# Patient Record
Sex: FEMALE | Race: OTHER | URBAN - METROPOLITAN AREA
[De-identification: names, ages, dates, MRNs, and addresses within clinical notes are randomized per-mention and may not be internally consistent; named-entity substitution may affect disease eponyms.]

---

## 2024-07-22 ENCOUNTER — INITIAL PRENATAL (OUTPATIENT)
Age: 34
End: 2024-07-22

## 2024-07-22 VITALS
BODY MASS INDEX: 31.41 KG/M2 | WEIGHT: 160 LBS | HEIGHT: 60 IN | DIASTOLIC BLOOD PRESSURE: 78 MMHG | SYSTOLIC BLOOD PRESSURE: 126 MMHG

## 2024-07-22 DIAGNOSIS — Z34.91 INITIAL OBSTETRIC VISIT IN FIRST TRIMESTER: Primary | ICD-10-CM

## 2024-07-22 DIAGNOSIS — Z34.90 PREGNANCY, UNSPECIFIED GESTATIONAL AGE: ICD-10-CM

## 2024-07-22 PROCEDURE — 0500F INITIAL PRENATAL CARE VISIT: CPT | Performed by: OBSTETRICS & GYNECOLOGY

## 2024-07-22 NOTE — PROGRESS NOTES
Initial OB note:  Please refer to problem list for concerns        Substance history: negative for alcohol, tobacco and street drugs.           Positive for nothing.  Exposure history: There is/are no indoor cat/s in the home.  The patient was instructed to not change the cat litter.   She admits close contact with children on a regular basis.   She has had chicken pox or the vaccine in the past.   Patient denies issues with domestic violence.     Genetic Screening/Teratology Counseling: (Includes patient, baby's father, or anyone in either family with:)  1.  Patient's age >/= 35 at EDC?--yes  2.  Thalassemia (Greenlandic, Greek, Mediterranean, or  background): MCV<80?--no.     3.  Neural tube defect (meningomyelocele, spina bifida, anencephaly)?--no.   4.  Congenital heart defect?--no.  5.  Down syndrome?--no.   6.  Juan Ramon-Sachs (Episcopalian, Vietnamese [place])?--no.   7.  Canavan's Disease?--no.   8.  Familial Dysautonomia?--no.   9.  Sickle cell disease or trait ()?--no   The patient has not been tested for sickle trait  10.  Hemophilia or other blood disorders?--no.   11.  Muscular dystrophy?--no.  12.  Cystic fibrosis?--no.  13.  [place]'s Chorea?--no.  14.  Mental retardation/autism (if yes was person tested for Fragile X)?--no.   15.  Other inherited genetic or chromosomal disorder?--no.   16.  Maternal metabolic disorder (DM, PKU, etc)?--no.  17.  Patient or FOB with a child with a birth defect not listed above?--no.  17a.  Patient or FOB with a birth defect themselves?--no.   18.  Recurrent pregnancy loss, or stillbirth?--no.  19.  Any medications since LMP other than prenatal vitamins (include vitamins, supplements, OTC meds, drugs, alcohol)?--no.  20.  Any other genetic/environmental exposure to discuss?--no.    Infection History:  1.  Lives with someone with TB or TB exposed?--no.   2.  Patient or partner has history of genital herpes?--no.  3.  Rash or viral illness since LMP?--no.    4.  History of

## 2024-07-22 NOTE — PROGRESS NOTES
Initial Prenatal Note    CC: Amenorrhea, positive pregnancy test    HPI:  Jesus Ace is a 34 y.o. No obstetric history on file. who presents for initial prenatal appointment. Since her LMP she has experienced fatigue and lightheadedness. She denies dysuria, discharge, vaginal bleeding.    LMP history:  The date of her LMP is 5/14, certain.  Her last menstrual period was normal.    Based on her LMP, her EDC is 18 and her EGA is 9 weeks,6 days.     Ultrasound data:  She had an  ultrasound done by the ultrasound tech today which revealed a viable spann pregnancy with a gestational age of 10 weeks and 5 days giving an EDC of 2025.  A SINGLE VIABLE 10W5D WITH TERESA OF 2025 IUP IS SEEN WITH NORMAL CARDIAC RHYTHM. THERE APPEARS TO BE A HYPOECHOIC AREA SEEN ADJACENT TO THE GESTATIONAL SAC MEASURING 28 X 15 MM, POSSIBLE NICOLLE. [facility]. A NORMAL YOLK SAC IS SEEN. RIGHT OVARY APPEARS WITHIN NORMAL LIMITS. LEFT OVARY APPEARS WITHIN NORMAL LIMITS. NO FREE FLUID IS SEEN IN THE CDS.    She is dated by 2TUS.    Relevant past pregnancy history:  She has the following pregnancy history: fetal demise at 22 weeks. Delivery at 36 weeks, induced due to Pre E  She does history of  delivery.    Relevant past medical history:   Noncontributory    Last Pap: years ago    Relevant social history:  Her occupation is: works from home.   Her partner's name is Amilcar.    1. Have you been to the ER, urgent care clinic, or hospitalized since your last visit?No    2. Have you seen or consulted any other health care providers outside of the [facility] since your last visit? No    She declines  a chaperone during the gynecologic exam today.      Laura Quigley LPN

## 2024-08-17 ENCOUNTER — ANESTHESIA EVENT (OUTPATIENT)
Facility: HOSPITAL | Age: 34
End: 2024-08-17
Payer: COMMERCIAL

## 2024-08-19 ENCOUNTER — ROUTINE PRENATAL (OUTPATIENT)
Age: 34
End: 2024-08-19

## 2024-08-19 VITALS — BODY MASS INDEX: 31.64 KG/M2 | WEIGHT: 162 LBS | SYSTOLIC BLOOD PRESSURE: 98 MMHG | DIASTOLIC BLOOD PRESSURE: 66 MMHG

## 2024-08-19 DIAGNOSIS — Z34.90 PREGNANCY, UNSPECIFIED GESTATIONAL AGE: Primary | ICD-10-CM

## 2024-08-19 DIAGNOSIS — Z34.82 PRENATAL CARE, SUBSEQUENT PREGNANCY, SECOND TRIMESTER: ICD-10-CM

## 2024-08-19 PROCEDURE — 0502F SUBSEQUENT PRENATAL CARE: CPT | Performed by: OBSTETRICS & GYNECOLOGY

## 2024-08-19 RX ORDER — ASPIRIN 81 MG/1
81 TABLET, CHEWABLE ORAL DAILY
COMMUNITY

## 2024-08-19 NOTE — PROGRESS NOTES
Doing well except for some nausea. Prenatal labs and panorama today.  Cerclage scheduled for this Wednesday.  Fully consented; Risks and benefits fully reviewed and all questions answered.

## 2024-08-20 LAB
ABO GROUP BLD: NORMAL
BLD GP AB SCN SERPL QL: NEGATIVE
ERYTHROCYTE [DISTWIDTH] IN BLOOD BY AUTOMATED COUNT: 12.9 % (ref 11.7–15.4)
HBV SURFACE AG SERPL QL IA: NEGATIVE
HCT VFR BLD AUTO: 39.5 % (ref 34–46.6)
HCV IGG SERPL QL IA: NON REACTIVE
HGB BLD-MCNC: 12.8 G/DL (ref 11.1–15.9)
HIV 1+2 AB+HIV1 P24 AG SERPL QL IA: NON REACTIVE
MCH RBC QN AUTO: 30.8 PG (ref 26.6–33)
MCHC RBC AUTO-ENTMCNC: 32.4 G/DL (ref 31.5–35.7)
MCV RBC AUTO: 95 FL (ref 79–97)
PLATELET # BLD AUTO: 369 X10E3/UL (ref 150–450)
RBC # BLD AUTO: 4.16 X10E6/UL (ref 3.77–5.28)
RH BLD: NEGATIVE
RUBV IGG SERPL IA-ACNC: 12.8 INDEX
VZV IGG SER IA-ACNC: 963 INDEX
WBC # BLD AUTO: 11.2 X10E3/UL (ref 3.4–10.8)

## 2024-08-21 ENCOUNTER — ANESTHESIA (OUTPATIENT)
Facility: HOSPITAL | Age: 34
End: 2024-08-21
Payer: COMMERCIAL

## 2024-08-21 ENCOUNTER — HOSPITAL ENCOUNTER (OUTPATIENT)
Facility: HOSPITAL | Age: 34
LOS: 1 days | Discharge: HOME | End: 2024-08-21
Attending: OBSTETRICS & GYNECOLOGY | Admitting: OBSTETRICS & GYNECOLOGY
Payer: COMMERCIAL

## 2024-08-21 VITALS
SYSTOLIC BLOOD PRESSURE: 107 MMHG | TEMPERATURE: 98.1 F | HEART RATE: 88 BPM | DIASTOLIC BLOOD PRESSURE: 55 MMHG | OXYGEN SATURATION: 100 % | RESPIRATION RATE: 14 BRPM

## 2024-08-21 PROBLEM — O09.299 HISTORY OF INCOMPETENT CERVIX, CURRENTLY PREGNANT: Status: ACTIVE | Noted: 2024-08-21

## 2024-08-21 LAB
ABO + RH BLD: NORMAL
BACTERIA UR CULT: NORMAL
BASOPHILS # BLD: 0 K/UL (ref 0–0.1)
BASOPHILS NFR BLD: 0 % (ref 0–1)
BLOOD GROUP ANTIBODIES SERPL: NORMAL
DIFFERENTIAL METHOD BLD: ABNORMAL
EOSINOPHIL # BLD: 0.1 K/UL (ref 0–0.4)
EOSINOPHIL NFR BLD: 1 % (ref 0–7)
ERYTHROCYTE [DISTWIDTH] IN BLOOD BY AUTOMATED COUNT: 12.7 % (ref 11.5–14.5)
HCT VFR BLD AUTO: 39.7 % (ref 35–47)
HGB BLD-MCNC: 13.1 G/DL (ref 11.5–16)
IMM GRANULOCYTES # BLD AUTO: 0.1 K/UL (ref 0–0.04)
IMM GRANULOCYTES NFR BLD AUTO: 1 % (ref 0–0.5)
LYMPHOCYTES # BLD: 3.4 K/UL (ref 0.8–3.5)
LYMPHOCYTES NFR BLD: 28 % (ref 12–49)
MCH RBC QN AUTO: 31.1 PG (ref 26–34)
MCHC RBC AUTO-ENTMCNC: 33 G/DL (ref 30–36.5)
MCV RBC AUTO: 94.3 FL (ref 80–99)
MONOCYTES # BLD: 0.8 K/UL (ref 0–1)
MONOCYTES NFR BLD: 7 % (ref 5–13)
NEUTS SEG # BLD: 7.6 K/UL (ref 1.8–8)
NEUTS SEG NFR BLD: 63 % (ref 32–75)
NRBC # BLD: 0 K/UL (ref 0–0.01)
NRBC BLD-RTO: 0 PER 100 WBC
PLATELET # BLD AUTO: 349 K/UL (ref 150–400)
PMV BLD AUTO: 9.9 FL (ref 8.9–12.9)
RBC # BLD AUTO: 4.21 M/UL (ref 3.8–5.2)
SPECIMEN EXP DATE BLD: NORMAL
TREPONEMA PALLIDUM IGG+IGM AB [PRESENCE] IN SERUM OR PLASMA BY IMMUNOASSAY: NON REACTIVE
WBC # BLD AUTO: 12.1 K/UL (ref 3.6–11)

## 2024-08-21 PROCEDURE — 6360000002 HC RX W HCPCS: Performed by: NURSE ANESTHETIST, CERTIFIED REGISTERED

## 2024-08-21 PROCEDURE — 2580000003 HC RX 258: Performed by: OBSTETRICS & GYNECOLOGY

## 2024-08-21 PROCEDURE — 7100000001 HC PACU RECOVERY - ADDTL 15 MIN: Performed by: OBSTETRICS & GYNECOLOGY

## 2024-08-21 PROCEDURE — 6360000002 HC RX W HCPCS: Performed by: OBSTETRICS & GYNECOLOGY

## 2024-08-21 PROCEDURE — 36415 COLL VENOUS BLD VENIPUNCTURE: CPT

## 2024-08-21 PROCEDURE — 86900 BLOOD TYPING SEROLOGIC ABO: CPT

## 2024-08-21 PROCEDURE — 2580000003 HC RX 258: Performed by: NURSE ANESTHETIST, CERTIFIED REGISTERED

## 2024-08-21 PROCEDURE — 3700000000 HC ANESTHESIA ATTENDED CARE: Performed by: OBSTETRICS & GYNECOLOGY

## 2024-08-21 PROCEDURE — 2709999900 HC NON-CHARGEABLE SUPPLY: Performed by: OBSTETRICS & GYNECOLOGY

## 2024-08-21 PROCEDURE — 7100000000 HC PACU RECOVERY - FIRST 15 MIN: Performed by: OBSTETRICS & GYNECOLOGY

## 2024-08-21 PROCEDURE — 3600000012 HC SURGERY LEVEL 2 ADDTL 15MIN: Performed by: OBSTETRICS & GYNECOLOGY

## 2024-08-21 PROCEDURE — 6360000002 HC RX W HCPCS: Performed by: ANESTHESIOLOGY

## 2024-08-21 PROCEDURE — 3700000001 HC ADD 15 MINUTES (ANESTHESIA): Performed by: OBSTETRICS & GYNECOLOGY

## 2024-08-21 PROCEDURE — 86850 RBC ANTIBODY SCREEN: CPT

## 2024-08-21 PROCEDURE — 86901 BLOOD TYPING SEROLOGIC RH(D): CPT

## 2024-08-21 PROCEDURE — 3600000002 HC SURGERY LEVEL 2 BASE: Performed by: OBSTETRICS & GYNECOLOGY

## 2024-08-21 PROCEDURE — 85025 COMPLETE CBC W/AUTO DIFF WBC: CPT

## 2024-08-21 RX ORDER — FENTANYL/BUPIVACAINE/NS/PF 2-1250MCG
1-15 PLASTIC BAG, INJECTION (ML) INJECTION CONTINUOUS
Status: DISCONTINUED | OUTPATIENT
Start: 2024-08-21 | End: 2024-08-21

## 2024-08-21 RX ORDER — EPHEDRINE SULFATE/0.9% NACL/PF 50 MG/5 ML
10 SYRINGE (ML) INTRAVENOUS
Status: DISCONTINUED | OUTPATIENT
Start: 2024-08-21 | End: 2024-08-21

## 2024-08-21 RX ORDER — KETOROLAC TROMETHAMINE 30 MG/ML
INJECTION, SOLUTION INTRAMUSCULAR; INTRAVENOUS PRN
Status: DISCONTINUED | OUTPATIENT
Start: 2024-08-21 | End: 2024-08-21 | Stop reason: SDUPTHER

## 2024-08-21 RX ORDER — SODIUM CHLORIDE, SODIUM LACTATE, POTASSIUM CHLORIDE, CALCIUM CHLORIDE 600; 310; 30; 20 MG/100ML; MG/100ML; MG/100ML; MG/100ML
INJECTION, SOLUTION INTRAVENOUS CONTINUOUS PRN
Status: DISCONTINUED | OUTPATIENT
Start: 2024-08-21 | End: 2024-08-21 | Stop reason: SDUPTHER

## 2024-08-21 RX ORDER — EPHEDRINE SULFATE/0.9% NACL/PF 50 MG/5 ML
5 SYRINGE (ML) INTRAVENOUS PRN
Status: DISCONTINUED | OUTPATIENT
Start: 2024-08-22 | End: 2024-08-21

## 2024-08-21 RX ORDER — SODIUM CHLORIDE, SODIUM LACTATE, POTASSIUM CHLORIDE, AND CALCIUM CHLORIDE .6; .31; .03; .02 G/100ML; G/100ML; G/100ML; G/100ML
1000 INJECTION, SOLUTION INTRAVENOUS ONCE
Status: DISCONTINUED | OUTPATIENT
Start: 2024-08-21 | End: 2024-08-21 | Stop reason: HOSPADM

## 2024-08-21 RX ORDER — EPHEDRINE SULFATE/0.9% NACL/PF 50 MG/5 ML
5 SYRINGE (ML) INTRAVENOUS PRN
Status: DISCONTINUED | OUTPATIENT
Start: 2024-08-22 | End: 2024-08-21 | Stop reason: HOSPADM

## 2024-08-21 RX ORDER — NALOXONE HYDROCHLORIDE 0.4 MG/ML
INJECTION, SOLUTION INTRAMUSCULAR; INTRAVENOUS; SUBCUTANEOUS PRN
Status: DISCONTINUED | OUTPATIENT
Start: 2024-08-21 | End: 2024-08-21 | Stop reason: HOSPADM

## 2024-08-21 RX ORDER — ONDANSETRON 2 MG/ML
4 INJECTION INTRAMUSCULAR; INTRAVENOUS EVERY 6 HOURS PRN
Status: DISCONTINUED | OUTPATIENT
Start: 2024-08-21 | End: 2024-08-21 | Stop reason: HOSPADM

## 2024-08-21 RX ORDER — ONDANSETRON 2 MG/ML
INJECTION INTRAMUSCULAR; INTRAVENOUS PRN
Status: DISCONTINUED | OUTPATIENT
Start: 2024-08-21 | End: 2024-08-21 | Stop reason: SDUPTHER

## 2024-08-21 RX ORDER — EPHEDRINE SULFATE/0.9% NACL/PF 50 MG/5 ML
10 SYRINGE (ML) INTRAVENOUS
Status: DISCONTINUED | OUTPATIENT
Start: 2024-08-21 | End: 2024-08-21 | Stop reason: HOSPADM

## 2024-08-21 RX ORDER — EPHEDRINE SULFATE 50 MG/ML
10 INJECTION INTRAVENOUS
Status: DISCONTINUED | OUTPATIENT
Start: 2024-08-21 | End: 2024-08-21 | Stop reason: HOSPADM

## 2024-08-21 RX ORDER — ONDANSETRON 2 MG/ML
4 INJECTION INTRAMUSCULAR; INTRAVENOUS EVERY 6 HOURS PRN
Status: DISCONTINUED | OUTPATIENT
Start: 2024-08-21 | End: 2024-08-21

## 2024-08-21 RX ORDER — NALOXONE HYDROCHLORIDE 0.4 MG/ML
INJECTION, SOLUTION INTRAMUSCULAR; INTRAVENOUS; SUBCUTANEOUS PRN
Status: DISCONTINUED | OUTPATIENT
Start: 2024-08-21 | End: 2024-08-21

## 2024-08-21 RX ORDER — DIPHENHYDRAMINE HCL 25 MG
25 CAPSULE ORAL EVERY 4 HOURS PRN
Status: DISCONTINUED | OUTPATIENT
Start: 2024-08-21 | End: 2024-08-21 | Stop reason: HOSPADM

## 2024-08-21 RX ORDER — DIPHENHYDRAMINE HYDROCHLORIDE 50 MG/ML
12.5 INJECTION INTRAMUSCULAR; INTRAVENOUS EVERY 4 HOURS PRN
Status: DISCONTINUED | OUTPATIENT
Start: 2024-08-21 | End: 2024-08-21

## 2024-08-21 RX ORDER — FENTANYL/BUPIVACAINE/NS/PF 2-1250MCG
1-15 PLASTIC BAG, INJECTION (ML) INJECTION CONTINUOUS
Status: DISCONTINUED | OUTPATIENT
Start: 2024-08-21 | End: 2024-08-21 | Stop reason: HOSPADM

## 2024-08-21 RX ORDER — BUPIVACAINE HYDROCHLORIDE 7.5 MG/ML
INJECTION, SOLUTION EPIDURAL; RETROBULBAR
Status: COMPLETED | OUTPATIENT
Start: 2024-08-21 | End: 2024-08-21

## 2024-08-21 RX ADMIN — ONDANSETRON HYDROCHLORIDE 4 MG: 2 INJECTION, SOLUTION INTRAMUSCULAR; INTRAVENOUS at 16:30

## 2024-08-21 RX ADMIN — KETOROLAC TROMETHAMINE 30 MG: 30 INJECTION, SOLUTION INTRAMUSCULAR; INTRAVENOUS at 16:46

## 2024-08-21 RX ADMIN — SODIUM CHLORIDE, POTASSIUM CHLORIDE, SODIUM LACTATE AND CALCIUM CHLORIDE: 600; 310; 30; 20 INJECTION, SOLUTION INTRAVENOUS at 16:46

## 2024-08-21 RX ADMIN — BUPIVACAINE HYDROCHLORIDE 5 MG: 7.5 INJECTION, SOLUTION EPIDURAL; RETROBULBAR at 16:13

## 2024-08-21 RX ADMIN — SODIUM CHLORIDE, POTASSIUM CHLORIDE, SODIUM LACTATE AND CALCIUM CHLORIDE: 600; 310; 30; 20 INJECTION, SOLUTION INTRAVENOUS at 16:07

## 2024-08-21 RX ADMIN — PHENYLEPHRINE HYDROCHLORIDE 40 MCG/MIN: 10 INJECTION INTRAVENOUS at 16:14

## 2024-08-21 RX ADMIN — WATER 2000 MG: 1 INJECTION INTRAMUSCULAR; INTRAVENOUS; SUBCUTANEOUS at 16:02

## 2024-08-21 NOTE — OP NOTE
Noble Cervical Cerclage Operative Report    Date of Surgery:  8/21/2024    Preoperative Diagnosis: Incompetent cervix [N88.3]; 15wk pregnant    Postoperative Diagnosis: Incompetent cervix [N88.3]    Surgeon(s):  Anay Basurto MD     Anesthesia: Spinal    Procedure: Procedure(s):  CERVIX CERCLAGE PLACEMENT  Delmer     EBL: 15cc    Complications:  none    Indications:  Jesus Ace is aHispanic female with a clinical history consistent with cervical incompetence.  Cervical Cerclage was offered for treatment of cervical incompetence and the risks were explained in detail in the office and again in the hospital prior to surgery. These included risk of infection, bleeding, bladder and bowel damage and pregnancy loss, along with rupture of membranes now or later in pregnancy. These complications were all explained in detail and questions answered. It was explained to the patient that she had the option of expectant management without Cerclage placement. She understood that her care and treatment would not be affected by her choice and there was no pressure on her to choose this course of treatment. After a long discussion and clear understanding by the patient, the patient consented to the procedure prior to coming to the hospital and, again on admission to the hospital.   .    Procedure Details:  Jesus Ace was taken to the Operating Room where spinal anesthetic was administered. The patient was then placed in the dorsal lithotomy position. The vulva and distal vagina were then gently prepped with Betadine solution and draped in a sterile fashion to expose the vaginal introitus.   The patient was then placed in Trendelenburg position to allow better visualization of the vaginal canal and the cervix. Using a bivalve speculum, the cervix and distal vagina were visualized. The vaginal portion of the cervical length was normal and parous.  Fetal membranes were not visualized.  The cervix and distal

## 2024-08-21 NOTE — ANESTHESIA PROCEDURE NOTES
Spinal Block    Patient location during procedure: OR  Reason for block: primary anesthetic  Staffing  Performed: anesthesiologist   Anesthesiologist: Ba Jenkins MD  Performed by: Ba Jenkins MD  Authorized by: Ba Jenkins MD    Spinal Block  Patient position: sitting  Prep: DuraPrep  Patient monitoring: cardiac monitor, continuous pulse ox, frequent blood pressure checks and oxygen  Approach: midline  Location: L4/L5  Provider prep: mask and sterile gloves  Needle  Needle type: pencil-tip   Needle gauge: 25 G  Needle length: 4 in  Assessment  Sensory level: T4  Events: None  Swirl obtained: Yes  CSF: clear  Attempts: 1  Hemodynamics: stable  Preanesthetic Checklist  Completed: patient identified, IV checked, site marked, risks and benefits discussed, surgical/procedural consents, equipment checked, pre-op evaluation, timeout performed, anesthesia consent given, oxygen available, monitors applied/VS acknowledged and fire risk safety assessment completed and verbalized

## 2024-08-21 NOTE — ANESTHESIA PRE PROCEDURE
\"[id number]\"     Type & Screen (If Applicable):  Lab Results   Component Value Date    LABABO A 08/19/2024       Drug/Infectious Status (If Applicable):  Lab Results   Component Value Date/Time    HEPCAB Non Reactive 08/19/2024 12:00 AM       COVID-19 Screening (If Applicable): No results found for: \"COVID19\"        Anesthesia Evaluation  Patient summary reviewed and Nursing notes reviewed  Airway: Mallampati: II  TM distance: >3 FB   Neck ROM: full  Mouth opening: > = 3 FB   Dental: normal exam         Pulmonary:Negative Pulmonary ROS breath sounds clear to auscultation                             Cardiovascular:Negative CV ROS  Exercise tolerance: good (>4 METS)  (+) hypertension:        Rhythm: regular  Rate: normal                    Neuro/Psych:   Negative Neuro/Psych ROS              GI/Hepatic/Renal: Neg GI/Hepatic/Renal ROS            Endo/Other: Negative Endo/Other ROS                    Abdominal: normal exam            Vascular: negative vascular ROS.         Other Findings:         Anesthesia Plan      spinal     ASA 2             Anesthetic plan and risks discussed with patient.      Plan discussed with CRNA.                  Ba Jenkins MD   8/21/2024

## 2024-08-21 NOTE — ANESTHESIA POSTPROCEDURE EVALUATION
Post-Anesthesia Evaluation and Assessment    Patient: Jesus Ace MRN: [id number]  SSN: [id number]    YOB: 1990  Age: 34 y.o.  Sex: female      I have evaluated the patient and they are stable and ready for discharge from the PACU.     Cardiovascular Function/Vital Signs  Visit Vitals  /68   Pulse 94   Temp 98.1 °F (36.7 °C) (Oral)   Resp 14   SpO2 100%       Patient is status post Spinal anesthesia for Procedure(s):  CERVIX CERCLAGE PLACEMENT.    Nausea/Vomiting: None    Postoperative hydration reviewed and adequate.    Pain:  Managed    Neurological Status:   At baseline    Mental Status, Level of Consciousness: Alert and  oriented to person, place, and time    Pulmonary Status:   Adequate oxygenation and airway patent    Complications related to anesthesia: None    Post-anesthesia assessment completed. No concerns    Signed By: Ba Jenkins MD     August 21, 2024

## 2024-08-21 NOTE — DISCHARGE INSTRUCTIONS
Cervical Cerclage to Prevent  Delivery: What to Expect at Home  Your Recovery  Cervical cerclage (say \"SER-vuh-kul ser-KLAZH\") is a procedure that helps keep your cervix from opening too soon. Your doctor has sewn your cervix shut to help prevent  labor.  For the next few days, you may have:  Cramping.  Spotting.  Pain when you urinate.  Your doctor may give you instructions on when you can do your normal activities again, such as driving and going back to work.  Your doctor will usually remove the stitches from your cervix at 36 to 38 weeks, or if you go into  labor or show signs of infection. If you are planning to have a , the stitches may be left in until you have the .  This care sheet gives you a general idea about how long it will take for you to recover. But each person recovers at a different pace. Follow the steps below to get better as quickly as possible.  How can you care for yourself at home?  Activity    Rest when you feel tired.     Ask your doctor when it is okay for you to have sex.   Medicines    Be safe with medicines. Read and follow all instructions on the label.     If you are not taking a prescription pain medicine, ask your doctor if you can take an over-the-counter medicine.     Your doctor will tell you if and when you can restart your medicines. The doctor will also give you instructions about taking any new medicines.   Follow-up care is a key part of your treatment and safety. Be sure to make and go to all appointments, and call your doctor if you are having problems. It's also a good idea to know your test results and keep a list of the medicines you take.  When should you call for help?   Call 911 anytime you think you may need emergency care. For example, call if:    You have severe trouble breathing.     You have sudden, severe pain in your belly.     You have severe vaginal bleeding.   Call your doctor now or seek immediate medical care

## 2024-08-21 NOTE — H&P
History & Physical    Name: Jesus Ace MRN: [id number]  SSN: [id number]    YOB: 1990  Age: 34 y.o.  Sex: female        Subjective:     Estimated Date of Delivery: 25  OB History          4    Para   3    Term   1       2    AB        Living   2         SAB        IAB        Ectopic        Molar        Multiple        Live Births   2                Ms. Chitra Ace is admitted with pregnancy at 15w0d for  placement of Noble's cerclage . Prenatal course as below. Please see prenatal records for details.    Patient Active Problem List    Diagnosis Date Noted    Pregnancy 2024     Primary Provider:  Sb LOPEZ  P2  EDC by 1st trim US   Timothy lr__; low dose aspirin  Hx PIH 36wk  Hx 22wk PTB; demise; incompetent cervix; 2 subsequent healthy births assoc w cerclage  Noble's cerclage  Hx  x3 as above  IOB labs:  Genetic Screening: panorama__  Anatomy:  GTT:  Flu:__  TDAP:__  COVID:__  RSV:__  Rhogam:__  GBS:  Circ:             Past Medical History:   Diagnosis Date    Hyperinsulinemia     Pre-eclampsia 2023     delivery Dec 2019     Past Surgical History:   Procedure Laterality Date    BREAST LUMPECTOMY      left    CERCLAGE   and      Social History     Socioeconomic History    Marital status:    Tobacco Use    Smoking status: Never   Vaping Use    Vaping status: Never Used   Substance and Sexual Activity    Alcohol use: Not Currently     Alcohol/week: 2.0 standard drinks of alcohol     Types: 2 Cans of beer per week    Drug use: Never    Sexual activity: Yes     Partners: Male     Social Determinants of Health     Financial Resource Strain: Low Risk  (2023)    Received from [facility], [facility] Short Social Needs Screening - Medical Financial Resource Strain     Would you like help with any of the following needs: food, medicine/medical supplies, transportation, loneliness, housing or

## 2024-08-22 LAB
C TRACH RRNA SPEC QL NAA+PROBE: NEGATIVE
HGB A MFR BLD ELPH: 96.7 % (ref 96.4–98.8)
HGB A2 MFR BLD ELPH: 2.8 % (ref 1.8–3.2)
HGB F MFR BLD ELPH: 0.5 % (ref 0–2)
HGB FRACT BLD-IMP: NORMAL
HGB S MFR BLD ELPH: 0 %
N GONORRHOEA RRNA SPEC QL NAA+PROBE: NEGATIVE
T VAGINALIS RRNA SPEC QL NAA+PROBE: NEGATIVE

## 2024-08-28 LAB
Lab: NORMAL
NTRA 22Q11.2 DELETION SYNDROME POPULATION-BASED RISK TEXT: NORMAL
NTRA 22Q11.2 DELETION SYNDROME RESULT TEXT: NORMAL
NTRA 22Q11.2 DELETION SYNDROME RISK SCORE TEXT: NORMAL
NTRA FETAL FRACTION: NORMAL
NTRA FETAL RHD SUMMARY: NORMAL
NTRA GENDER OF FETUS: NORMAL
NTRA MONOSOMY X AGE-BASED RISK TEXT: NORMAL
NTRA MONOSOMY X RESULT TEXT: NORMAL
NTRA MONOSOMY X RISK SCORE TEXT: NORMAL
NTRA TRIPLOIDY RESULT TEXT: NORMAL
NTRA TRISOMY 13 AGE-BASED RISK TEXT: NORMAL
NTRA TRISOMY 13 RESULT TEXT: NORMAL
NTRA TRISOMY 13 RISK SCORE TEXT: NORMAL
NTRA TRISOMY 18 AGE-BASED RISK TEXT: NORMAL
NTRA TRISOMY 18 RESULT TEXT: NORMAL
NTRA TRISOMY 18 RISK SCORE TEXT: NORMAL
NTRA TRISOMY 21 AGE-BASED RISK TEXT: NORMAL
NTRA TRISOMY 21 RESULT TEXT: NORMAL
NTRA TRISOMY 21 RISK SCORE TEXT: NORMAL

## 2024-09-17 ENCOUNTER — ROUTINE PRENATAL (OUTPATIENT)
Age: 34
End: 2024-09-17

## 2024-09-17 VITALS
HEART RATE: 71 BPM | WEIGHT: 166 LBS | DIASTOLIC BLOOD PRESSURE: 71 MMHG | OXYGEN SATURATION: 99 % | BODY MASS INDEX: 32.42 KG/M2 | SYSTOLIC BLOOD PRESSURE: 112 MMHG

## 2024-09-17 DIAGNOSIS — Z34.90 PREGNANCY, UNSPECIFIED GESTATIONAL AGE: Primary | ICD-10-CM

## 2024-09-17 PROCEDURE — 0502F SUBSEQUENT PRENATAL CARE: CPT | Performed by: OBSTETRICS & GYNECOLOGY

## 2024-09-27 DIAGNOSIS — O09.299 HISTORY OF INCOMPETENT CERVIX, CURRENTLY PREGNANT: Primary | ICD-10-CM

## 2024-09-30 ENCOUNTER — ROUTINE PRENATAL (OUTPATIENT)
Age: 34
End: 2024-09-30

## 2024-09-30 VITALS
DIASTOLIC BLOOD PRESSURE: 81 MMHG | SYSTOLIC BLOOD PRESSURE: 115 MMHG | OXYGEN SATURATION: 99 % | BODY MASS INDEX: 32.42 KG/M2 | WEIGHT: 166 LBS | HEART RATE: 76 BPM | TEMPERATURE: 98.1 F

## 2024-09-30 DIAGNOSIS — Z34.82 PRENATAL CARE, SUBSEQUENT PREGNANCY IN SECOND TRIMESTER: Primary | ICD-10-CM

## 2024-09-30 DIAGNOSIS — Z34.90 PREGNANCY, UNSPECIFIED GESTATIONAL AGE: ICD-10-CM

## 2024-09-30 PROCEDURE — 0502F SUBSEQUENT PRENATAL CARE: CPT | Performed by: OBSTETRICS & GYNECOLOGY

## 2024-10-25 ENCOUNTER — ROUTINE PRENATAL (OUTPATIENT)
Age: 34
End: 2024-10-25

## 2024-10-25 VITALS
DIASTOLIC BLOOD PRESSURE: 77 MMHG | WEIGHT: 169 LBS | BODY MASS INDEX: 33.01 KG/M2 | SYSTOLIC BLOOD PRESSURE: 117 MMHG | HEART RATE: 87 BPM

## 2024-10-25 DIAGNOSIS — Z34.90 PREGNANCY, UNSPECIFIED GESTATIONAL AGE: Primary | ICD-10-CM

## 2024-11-20 ENCOUNTER — ROUTINE PRENATAL (OUTPATIENT)
Age: 34
End: 2024-11-20
Payer: COMMERCIAL

## 2024-11-20 VITALS — DIASTOLIC BLOOD PRESSURE: 80 MMHG | WEIGHT: 176 LBS | SYSTOLIC BLOOD PRESSURE: 133 MMHG | BODY MASS INDEX: 34.37 KG/M2

## 2024-11-20 DIAGNOSIS — Z34.80 PRENATAL CARE OF MULTIGRAVIDA, ANTEPARTUM: Primary | ICD-10-CM

## 2024-11-20 DIAGNOSIS — Z34.90 PREGNANCY, UNSPECIFIED GESTATIONAL AGE: ICD-10-CM

## 2024-11-20 PROCEDURE — 90715 TDAP VACCINE 7 YRS/> IM: CPT | Performed by: OBSTETRICS & GYNECOLOGY

## 2024-11-20 PROCEDURE — 0502F SUBSEQUENT PRENATAL CARE: CPT | Performed by: OBSTETRICS & GYNECOLOGY

## 2024-11-20 PROCEDURE — 90471 IMMUNIZATION ADMIN: CPT | Performed by: OBSTETRICS & GYNECOLOGY

## 2024-11-20 NOTE — PROGRESS NOTES
Glucola and TDAP today. Needs rhogam but it is not in the office today.  C/o braxtons good and constipation. Good FM.

## 2024-11-20 NOTE — PROGRESS NOTES
Feeling some BHCs; constipated; Conservative management fully reviewed and all questions answered.   Reassurance and precautions  Tdap now; rhogam next visit  Recommend maternity belt

## 2024-11-21 LAB
BLD GP AB SCN SERPL QL: NEGATIVE
ERYTHROCYTE [DISTWIDTH] IN BLOOD BY AUTOMATED COUNT: 12.2 % (ref 11.7–15.4)
GLUCOSE 1H P 50 G GLC PO SERPL-MCNC: 108 MG/DL (ref 70–139)
HCT VFR BLD AUTO: 35.6 % (ref 34–46.6)
HGB BLD-MCNC: 11.8 G/DL (ref 11.1–15.9)
MCH RBC QN AUTO: 31.7 PG (ref 26.6–33)
MCHC RBC AUTO-ENTMCNC: 33.1 G/DL (ref 31.5–35.7)
MCV RBC AUTO: 96 FL (ref 79–97)
PLATELET # BLD AUTO: 334 X10E3/UL (ref 150–450)
RBC # BLD AUTO: 3.72 X10E6/UL (ref 3.77–5.28)
WBC # BLD AUTO: 10.2 X10E3/UL (ref 3.4–10.8)

## 2024-11-22 LAB — TREPONEMA PALLIDUM IGG+IGM AB [PRESENCE] IN SERUM OR PLASMA BY IMMUNOASSAY: NON REACTIVE

## 2024-12-06 ENCOUNTER — ROUTINE PRENATAL (OUTPATIENT)
Age: 34
End: 2024-12-06
Payer: COMMERCIAL

## 2024-12-06 VITALS
HEART RATE: 87 BPM | WEIGHT: 175 LBS | SYSTOLIC BLOOD PRESSURE: 109 MMHG | DIASTOLIC BLOOD PRESSURE: 76 MMHG | BODY MASS INDEX: 34.36 KG/M2 | HEIGHT: 60 IN | OXYGEN SATURATION: 98 %

## 2024-12-06 DIAGNOSIS — Z34.80 PRENATAL CARE OF MULTIGRAVIDA, ANTEPARTUM: Primary | ICD-10-CM

## 2024-12-06 DIAGNOSIS — Z34.90 PREGNANCY, UNSPECIFIED GESTATIONAL AGE: ICD-10-CM

## 2024-12-06 PROCEDURE — 96372 THER/PROPH/DIAG INJ SC/IM: CPT | Performed by: OBSTETRICS & GYNECOLOGY

## 2024-12-06 PROCEDURE — 0502F SUBSEQUENT PRENATAL CARE: CPT | Performed by: OBSTETRICS & GYNECOLOGY

## 2024-12-06 NOTE — PROGRESS NOTES
Having occas [facility]; reviewed precautions; no pelvic pressure; last visit; cx firm and closed; stitch in place  Rhogam now

## 2024-12-20 ENCOUNTER — ROUTINE PRENATAL (OUTPATIENT)
Age: 34
End: 2024-12-20

## 2024-12-20 VITALS
SYSTOLIC BLOOD PRESSURE: 115 MMHG | WEIGHT: 176 LBS | HEART RATE: 94 BPM | BODY MASS INDEX: 34.37 KG/M2 | DIASTOLIC BLOOD PRESSURE: 72 MMHG

## 2024-12-20 DIAGNOSIS — Z34.90 PREGNANCY, UNSPECIFIED GESTATIONAL AGE: ICD-10-CM

## 2024-12-20 DIAGNOSIS — Z34.80 PRENATAL CARE OF MULTIGRAVIDA, ANTEPARTUM: Primary | ICD-10-CM

## 2024-12-20 PROCEDURE — 0502F SUBSEQUENT PRENATAL CARE: CPT | Performed by: OBSTETRICS & GYNECOLOGY

## 2025-01-02 ENCOUNTER — ROUTINE PRENATAL (OUTPATIENT)
Age: 35
End: 2025-01-02

## 2025-01-02 VITALS
BODY MASS INDEX: 34.96 KG/M2 | WEIGHT: 179 LBS | SYSTOLIC BLOOD PRESSURE: 132 MMHG | HEART RATE: 90 BPM | DIASTOLIC BLOOD PRESSURE: 82 MMHG

## 2025-01-02 DIAGNOSIS — Z34.80 PRENATAL CARE OF MULTIGRAVIDA, ANTEPARTUM: Primary | ICD-10-CM

## 2025-01-02 PROCEDURE — 0502F SUBSEQUENT PRENATAL CARE: CPT | Performed by: OBSTETRICS & GYNECOLOGY

## 2025-01-14 ENCOUNTER — ROUTINE PRENATAL (OUTPATIENT)
Age: 35
End: 2025-01-14

## 2025-01-14 VITALS
SYSTOLIC BLOOD PRESSURE: 133 MMHG | WEIGHT: 182 LBS | DIASTOLIC BLOOD PRESSURE: 85 MMHG | HEART RATE: 78 BPM | BODY MASS INDEX: 35.54 KG/M2

## 2025-01-14 DIAGNOSIS — Z34.80 PRENATAL CARE OF MULTIGRAVIDA, ANTEPARTUM: Primary | ICD-10-CM

## 2025-01-14 DIAGNOSIS — Z3A.35 35 WEEKS GESTATION OF PREGNANCY: ICD-10-CM

## 2025-01-14 PROCEDURE — 0502F SUBSEQUENT PRENATAL CARE: CPT | Performed by: OBSTETRICS & GYNECOLOGY

## 2025-01-14 NOTE — PROGRESS NOTES
Doing well; lots of pressure  GBS  Cerclage successfully removed; both stitches  Cx thick/ 1cm  precautions

## 2025-01-16 LAB — GP B STREP DNA SPEC QL NAA+PROBE: NEGATIVE

## 2025-01-24 ENCOUNTER — ROUTINE PRENATAL (OUTPATIENT)
Age: 35
End: 2025-01-24

## 2025-01-24 VITALS
WEIGHT: 184 LBS | OXYGEN SATURATION: 97 % | SYSTOLIC BLOOD PRESSURE: 142 MMHG | BODY MASS INDEX: 35.94 KG/M2 | DIASTOLIC BLOOD PRESSURE: 93 MMHG | HEART RATE: 106 BPM

## 2025-01-24 DIAGNOSIS — Z34.80 PRENATAL CARE OF MULTIGRAVIDA, ANTEPARTUM: Primary | ICD-10-CM

## 2025-01-24 PROCEDURE — 0502F SUBSEQUENT PRENATAL CARE: CPT | Performed by: OBSTETRICS & GYNECOLOGY

## 2025-01-24 NOTE — PROGRESS NOTES
Mild LE edema; hx PIH past pregnancies  Gestational hypertension reviewed and labs obtained   Agrees to indxn this Monday

## 2025-01-25 LAB
ALBUMIN SERPL-MCNC: 3.6 G/DL (ref 3.9–4.9)
ALP SERPL-CCNC: 185 IU/L (ref 44–121)
ALT SERPL-CCNC: 12 IU/L (ref 0–32)
AST SERPL-CCNC: 21 IU/L (ref 0–40)
BILIRUB SERPL-MCNC: 0.2 MG/DL (ref 0–1.2)
BUN SERPL-MCNC: 4 MG/DL (ref 6–20)
BUN/CREAT SERPL: 8 (ref 9–23)
CALCIUM SERPL-MCNC: 9 MG/DL (ref 8.7–10.2)
CHLORIDE SERPL-SCNC: 105 MMOL/L (ref 96–106)
CO2 SERPL-SCNC: 21 MMOL/L (ref 20–29)
CREAT SERPL-MCNC: 0.52 MG/DL (ref 0.57–1)
CREAT UR-MCNC: 22.9 MG/DL
EGFRCR SERPLBLD CKD-EPI 2021: 124 ML/MIN/1.73
ERYTHROCYTE [DISTWIDTH] IN BLOOD BY AUTOMATED COUNT: 12.6 % (ref 11.7–15.4)
GLOBULIN SER CALC-MCNC: 2.5 G/DL (ref 1.5–4.5)
GLUCOSE SERPL-MCNC: 87 MG/DL (ref 70–99)
HCT VFR BLD AUTO: 39.4 % (ref 34–46.6)
HGB BLD-MCNC: 12.7 G/DL (ref 11.1–15.9)
MCH RBC QN AUTO: 30 PG (ref 26.6–33)
MCHC RBC AUTO-ENTMCNC: 32.2 G/DL (ref 31.5–35.7)
MCV RBC AUTO: 93 FL (ref 79–97)
PLATELET # BLD AUTO: 295 X10E3/UL (ref 150–450)
POTASSIUM SERPL-SCNC: 4.4 MMOL/L (ref 3.5–5.2)
PROT SERPL-MCNC: 6.1 G/DL (ref 6–8.5)
PROT UR-MCNC: 5.5 MG/DL
PROT/CREAT UR: 240 MG/G CREAT (ref 0–200)
RBC # BLD AUTO: 4.23 X10E6/UL (ref 3.77–5.28)
SODIUM SERPL-SCNC: 139 MMOL/L (ref 134–144)
URATE SERPL-MCNC: 3.7 MG/DL (ref 2.6–6.2)
WBC # BLD AUTO: 9.3 X10E3/UL (ref 3.4–10.8)

## 2025-01-28 ENCOUNTER — HOSPITAL ENCOUNTER (INPATIENT)
Facility: HOSPITAL | Age: 35
LOS: 3 days | Discharge: HOME | End: 2025-01-31
Attending: OBSTETRICS & GYNECOLOGY | Admitting: OBSTETRICS & GYNECOLOGY
Payer: COMMERCIAL

## 2025-01-28 LAB
ALBUMIN SERPL-MCNC: 2.9 G/DL (ref 3.5–5)
ALBUMIN/GLOB SERPL: 0.8 (ref 1.1–2.2)
ALP SERPL-CCNC: 194 U/L (ref 45–117)
ALT SERPL-CCNC: 19 U/L (ref 12–78)
ANION GAP SERPL CALC-SCNC: 7 MMOL/L (ref 2–12)
AST SERPL-CCNC: 26 U/L (ref 15–37)
BILIRUB SERPL-MCNC: 0.3 MG/DL (ref 0.2–1)
BUN SERPL-MCNC: 6 MG/DL (ref 6–20)
BUN/CREAT SERPL: 17 (ref 12–20)
CALCIUM SERPL-MCNC: 9.8 MG/DL (ref 8.5–10.1)
CHLORIDE SERPL-SCNC: 109 MMOL/L (ref 97–108)
CO2 SERPL-SCNC: 22 MMOL/L (ref 21–32)
CREAT SERPL-MCNC: 0.36 MG/DL (ref 0.55–1.02)
CREAT UR-MCNC: 32.8 MG/DL
ERYTHROCYTE [DISTWIDTH] IN BLOOD BY AUTOMATED COUNT: 12.9 % (ref 11.5–14.5)
GLOBULIN SER CALC-MCNC: 3.6 G/DL (ref 2–4)
GLUCOSE SERPL-MCNC: 82 MG/DL (ref 65–100)
HCT VFR BLD AUTO: 35.7 % (ref 35–47)
HGB BLD-MCNC: 11.9 G/DL (ref 11.5–16)
MCH RBC QN AUTO: 30.6 PG (ref 26–34)
MCHC RBC AUTO-ENTMCNC: 33.3 G/DL (ref 30–36.5)
MCV RBC AUTO: 91.8 FL (ref 80–99)
NRBC # BLD: 0 K/UL (ref 0–0.01)
NRBC BLD-RTO: 0 PER 100 WBC
PLATELET # BLD AUTO: 282 K/UL (ref 150–400)
PMV BLD AUTO: 9.3 FL (ref 8.9–12.9)
POTASSIUM SERPL-SCNC: 3.9 MMOL/L (ref 3.5–5.1)
PROT SERPL-MCNC: 6.5 G/DL (ref 6.4–8.2)
PROT UR-MCNC: 13 MG/DL (ref 0–11.9)
PROT/CREAT UR-RTO: 0.4
RBC # BLD AUTO: 3.89 M/UL (ref 3.8–5.2)
RPR SER QL: NONREACTIVE
SODIUM SERPL-SCNC: 138 MMOL/L (ref 136–145)
WBC # BLD AUTO: 8.4 K/UL (ref 3.6–11)

## 2025-01-28 PROCEDURE — 86901 BLOOD TYPING SEROLOGIC RH(D): CPT

## 2025-01-28 PROCEDURE — 80053 COMPREHEN METABOLIC PANEL: CPT

## 2025-01-28 PROCEDURE — 86850 RBC ANTIBODY SCREEN: CPT

## 2025-01-28 PROCEDURE — 86870 RBC ANTIBODY IDENTIFICATION: CPT

## 2025-01-28 PROCEDURE — C1726 CATH, BAL DIL, NON-VASCULAR: HCPCS

## 2025-01-28 PROCEDURE — 7210000100 HC LABOR FEE PER 1 HR

## 2025-01-28 PROCEDURE — 86592 SYPHILIS TEST NON-TREP QUAL: CPT

## 2025-01-28 PROCEDURE — 82570 ASSAY OF URINE CREATININE: CPT

## 2025-01-28 PROCEDURE — 85027 COMPLETE CBC AUTOMATED: CPT

## 2025-01-28 PROCEDURE — 86920 COMPATIBILITY TEST SPIN: CPT

## 2025-01-28 PROCEDURE — 86921 COMPATIBILITY TEST INCUBATE: CPT

## 2025-01-28 PROCEDURE — 59200 INSERT CERVICAL DILATOR: CPT

## 2025-01-28 PROCEDURE — 86900 BLOOD TYPING SEROLOGIC ABO: CPT

## 2025-01-28 PROCEDURE — 1100000000 HC RM PRIVATE

## 2025-01-28 PROCEDURE — 84156 ASSAY OF PROTEIN URINE: CPT

## 2025-01-28 PROCEDURE — 36415 COLL VENOUS BLD VENIPUNCTURE: CPT

## 2025-01-28 PROCEDURE — 86922 COMPATIBILITY TEST ANTIGLOB: CPT

## 2025-01-28 NOTE — PROGRESS NOTES
1739. Pt arrived from home admitted to room 7 with  at side for induction for GHNT.    1755. Iv placed by danielle Ngo, rn with ease, blood work collected and sent to lab.     1900. Dr. Basurto at bedside to assess pt. Fetal heart rate, contractions, VS and labs reviewed.     1902. Dr. Basurto performed sve. Pt is 1-2, 60% and -2 station. Pt tolerated well.     1905. Dr. Basurto placed cervical ripening balloon, 60ml vaginal, 60ml uterine. Pt tolerated well. Plan of care discussed. Pt can have intermittent monitoring over night unless otherwise indicated.     [id number]. Pt off monitor in bathroom.     [id number]. Pt off monitor in bathroom.     [id number]. Pt off monitor in bathroom.     2345. Bedside and Verbal shift change report given to tapan Bobo rn (oncoming nurse) by danielle Ngo rn (offgoing nurse). Report included the following information Nurse Handoff Report, MAR, and Recent Results.      [id number]. Pt off monitor in bathroom.

## 2025-01-29 ENCOUNTER — ANESTHESIA (OUTPATIENT)
Facility: HOSPITAL | Age: 35
End: 2025-01-29
Payer: COMMERCIAL

## 2025-01-29 ENCOUNTER — ANESTHESIA EVENT (OUTPATIENT)
Facility: HOSPITAL | Age: 35
End: 2025-01-29
Payer: COMMERCIAL

## 2025-01-29 PROBLEM — Z34.90 ENCOUNTER FOR INDUCTION OF LABOR: Status: ACTIVE | Noted: 2025-01-29

## 2025-01-29 PROCEDURE — 2580000003 HC RX 258: Performed by: OBSTETRICS & GYNECOLOGY

## 2025-01-29 PROCEDURE — 10907ZC DRAINAGE OF AMNIOTIC FLUID, THERAPEUTIC FROM PRODUCTS OF CONCEPTION, VIA NATURAL OR ARTIFICIAL OPENING: ICD-10-PCS | Performed by: OBSTETRICS & GYNECOLOGY

## 2025-01-29 PROCEDURE — 6360000002 HC RX W HCPCS

## 2025-01-29 PROCEDURE — 7220000101 HC DELIVERY VAGINAL/SINGLE

## 2025-01-29 PROCEDURE — 51701 INSERT BLADDER CATHETER: CPT

## 2025-01-29 PROCEDURE — 6360000002 HC RX W HCPCS: Performed by: OBSTETRICS & GYNECOLOGY

## 2025-01-29 PROCEDURE — 1120000000 HC RM PRIVATE OB

## 2025-01-29 PROCEDURE — 2580000003 HC RX 258

## 2025-01-29 PROCEDURE — 7100000001 HC PACU RECOVERY - ADDTL 15 MIN

## 2025-01-29 PROCEDURE — 3700000025 EPIDURAL BLOCK: Performed by: ANESTHESIOLOGY

## 2025-01-29 PROCEDURE — 6360000002 HC RX W HCPCS: Performed by: ANESTHESIOLOGY

## 2025-01-29 PROCEDURE — 00HU33Z INSERTION OF INFUSION DEVICE INTO SPINAL CANAL, PERCUTANEOUS APPROACH: ICD-10-PCS | Performed by: ANESTHESIOLOGY

## 2025-01-29 PROCEDURE — 59400 OBSTETRICAL CARE: CPT | Performed by: OBSTETRICS & GYNECOLOGY

## 2025-01-29 PROCEDURE — 7210000100 HC LABOR FEE PER 1 HR

## 2025-01-29 PROCEDURE — 3E033VJ INTRODUCTION OF OTHER HORMONE INTO PERIPHERAL VEIN, PERCUTANEOUS APPROACH: ICD-10-PCS | Performed by: OBSTETRICS & GYNECOLOGY

## 2025-01-29 PROCEDURE — 7100000000 HC PACU RECOVERY - FIRST 15 MIN

## 2025-01-29 RX ORDER — FENTANYL CITRATE 50 UG/ML
INJECTION, SOLUTION INTRAMUSCULAR; INTRAVENOUS
Status: DISCONTINUED | OUTPATIENT
Start: 2025-01-29 | End: 2025-01-29 | Stop reason: SDUPTHER

## 2025-01-29 RX ORDER — FENTANYL CITRATE 50 UG/ML
25 INJECTION, SOLUTION INTRAMUSCULAR; INTRAVENOUS
Status: DISCONTINUED | OUTPATIENT
Start: 2025-01-29 | End: 2025-01-29

## 2025-01-29 RX ORDER — MODIFIED LANOLIN
OINTMENT (GRAM) TOPICAL PRN
Status: DISCONTINUED | OUTPATIENT
Start: 2025-01-29 | End: 2025-01-31 | Stop reason: HOSPADM

## 2025-01-29 RX ORDER — CARBOPROST TROMETHAMINE 250 UG/ML
250 INJECTION, SOLUTION INTRAMUSCULAR PRN
Status: DISCONTINUED | OUTPATIENT
Start: 2025-01-29 | End: 2025-01-29

## 2025-01-29 RX ORDER — SODIUM CHLORIDE 0.9 % (FLUSH) 0.9 %
5-40 SYRINGE (ML) INJECTION EVERY 12 HOURS SCHEDULED
Status: DISCONTINUED | OUTPATIENT
Start: 2025-01-29 | End: 2025-01-29

## 2025-01-29 RX ORDER — BUTORPHANOL TARTRATE 1 MG/ML
1 INJECTION, SOLUTION INTRAMUSCULAR; INTRAVENOUS
Status: DISCONTINUED | OUTPATIENT
Start: 2025-01-29 | End: 2025-01-29

## 2025-01-29 RX ORDER — SODIUM CHLORIDE, SODIUM LACTATE, POTASSIUM CHLORIDE, AND CALCIUM CHLORIDE .6; .31; .03; .02 G/100ML; G/100ML; G/100ML; G/100ML
500 INJECTION, SOLUTION INTRAVENOUS PRN
Status: DISCONTINUED | OUTPATIENT
Start: 2025-01-29 | End: 2025-01-29

## 2025-01-29 RX ORDER — SODIUM CHLORIDE 0.9 % (FLUSH) 0.9 %
5-40 SYRINGE (ML) INJECTION EVERY 12 HOURS SCHEDULED
Status: DISCONTINUED | OUTPATIENT
Start: 2025-01-29 | End: 2025-01-31 | Stop reason: HOSPADM

## 2025-01-29 RX ORDER — SODIUM CHLORIDE 9 MG/ML
INJECTION, SOLUTION INTRAVENOUS PRN
Status: DISCONTINUED | OUTPATIENT
Start: 2025-01-29 | End: 2025-01-31 | Stop reason: HOSPADM

## 2025-01-29 RX ORDER — ONDANSETRON 2 MG/ML
4 INJECTION INTRAMUSCULAR; INTRAVENOUS EVERY 6 HOURS PRN
Status: DISCONTINUED | OUTPATIENT
Start: 2025-01-29 | End: 2025-01-29 | Stop reason: SDUPTHER

## 2025-01-29 RX ORDER — FENTANYL CITRATE 50 UG/ML
INJECTION, SOLUTION INTRAMUSCULAR; INTRAVENOUS
Status: COMPLETED
Start: 2025-01-29 | End: 2025-01-29

## 2025-01-29 RX ORDER — MISOPROSTOL 200 UG/1
400 TABLET ORAL PRN
Status: DISCONTINUED | OUTPATIENT
Start: 2025-01-29 | End: 2025-01-29

## 2025-01-29 RX ORDER — SODIUM CHLORIDE 0.9 % (FLUSH) 0.9 %
5-40 SYRINGE (ML) INJECTION PRN
Status: DISCONTINUED | OUTPATIENT
Start: 2025-01-29 | End: 2025-01-29

## 2025-01-29 RX ORDER — ONDANSETRON 2 MG/ML
4 INJECTION INTRAMUSCULAR; INTRAVENOUS EVERY 6 HOURS PRN
Status: DISCONTINUED | OUTPATIENT
Start: 2025-01-29 | End: 2025-01-29

## 2025-01-29 RX ORDER — SODIUM CHLORIDE 0.9 % (FLUSH) 0.9 %
5-40 SYRINGE (ML) INJECTION PRN
Status: DISCONTINUED | OUTPATIENT
Start: 2025-01-29 | End: 2025-01-31 | Stop reason: HOSPADM

## 2025-01-29 RX ORDER — ACETAMINOPHEN 500 MG
1000 TABLET ORAL EVERY 8 HOURS SCHEDULED
Status: DISCONTINUED | OUTPATIENT
Start: 2025-01-29 | End: 2025-01-31 | Stop reason: HOSPADM

## 2025-01-29 RX ORDER — ONDANSETRON 4 MG/1
4 TABLET, ORALLY DISINTEGRATING ORAL EVERY 6 HOURS PRN
Status: DISCONTINUED | OUTPATIENT
Start: 2025-01-29 | End: 2025-01-29

## 2025-01-29 RX ORDER — BUPIVACAINE HYDROCHLORIDE 2.5 MG/ML
INJECTION, SOLUTION EPIDURAL; INFILTRATION; INTRACAUDAL
Status: COMPLETED
Start: 2025-01-29 | End: 2025-01-29

## 2025-01-29 RX ORDER — ONDANSETRON 4 MG/1
4 TABLET, ORALLY DISINTEGRATING ORAL EVERY 6 HOURS PRN
Status: DISCONTINUED | OUTPATIENT
Start: 2025-01-29 | End: 2025-01-31 | Stop reason: HOSPADM

## 2025-01-29 RX ORDER — DOCUSATE SODIUM 100 MG/1
100 CAPSULE, LIQUID FILLED ORAL 2 TIMES DAILY
Status: DISCONTINUED | OUTPATIENT
Start: 2025-01-29 | End: 2025-01-31 | Stop reason: HOSPADM

## 2025-01-29 RX ORDER — FENTANYL/BUPIVACAINE/NS/PF 2-1250MCG
1-15 PLASTIC BAG, INJECTION (ML) INJECTION CONTINUOUS
Status: DISCONTINUED | OUTPATIENT
Start: 2025-01-29 | End: 2025-01-29

## 2025-01-29 RX ORDER — NALOXONE HYDROCHLORIDE 0.4 MG/ML
INJECTION, SOLUTION INTRAMUSCULAR; INTRAVENOUS; SUBCUTANEOUS PRN
Status: DISCONTINUED | OUTPATIENT
Start: 2025-01-29 | End: 2025-01-29

## 2025-01-29 RX ORDER — BUPIVACAINE HYDROCHLORIDE 2.5 MG/ML
INJECTION, SOLUTION EPIDURAL; INFILTRATION; INTRACAUDAL
Status: DISCONTINUED | OUTPATIENT
Start: 2025-01-29 | End: 2025-01-29 | Stop reason: SDUPTHER

## 2025-01-29 RX ORDER — MINERAL OIL
OIL (ML) MISCELLANEOUS ONCE
Status: DISCONTINUED | OUTPATIENT
Start: 2025-01-29 | End: 2025-01-29

## 2025-01-29 RX ORDER — SODIUM CHLORIDE, SODIUM LACTATE, POTASSIUM CHLORIDE, CALCIUM CHLORIDE 600; 310; 30; 20 MG/100ML; MG/100ML; MG/100ML; MG/100ML
INJECTION, SOLUTION INTRAVENOUS CONTINUOUS
Status: DISCONTINUED | OUTPATIENT
Start: 2025-01-29 | End: 2025-01-29

## 2025-01-29 RX ORDER — EPHEDRINE SULFATE 50 MG/ML
INJECTION INTRAVENOUS
Status: DISCONTINUED
Start: 2025-01-29 | End: 2025-01-29 | Stop reason: WASHOUT

## 2025-01-29 RX ORDER — SODIUM CHLORIDE 9 MG/ML
INJECTION, SOLUTION INTRAVENOUS PRN
Status: DISCONTINUED | OUTPATIENT
Start: 2025-01-29 | End: 2025-01-29

## 2025-01-29 RX ORDER — EPHEDRINE SULFATE 50 MG/ML
5 INJECTION INTRAVENOUS PRN
Status: DISCONTINUED | OUTPATIENT
Start: 2025-01-30 | End: 2025-01-29

## 2025-01-29 RX ORDER — SODIUM CHLORIDE, SODIUM LACTATE, POTASSIUM CHLORIDE, AND CALCIUM CHLORIDE .6; .31; .03; .02 G/100ML; G/100ML; G/100ML; G/100ML
1000 INJECTION, SOLUTION INTRAVENOUS PRN
Status: DISCONTINUED | OUTPATIENT
Start: 2025-01-29 | End: 2025-01-29

## 2025-01-29 RX ORDER — IBUPROFEN 400 MG/1
800 TABLET, FILM COATED ORAL EVERY 8 HOURS SCHEDULED
Status: DISCONTINUED | OUTPATIENT
Start: 2025-01-29 | End: 2025-01-31 | Stop reason: HOSPADM

## 2025-01-29 RX ORDER — METHYLERGONOVINE MALEATE 0.2 MG/ML
200 INJECTION INTRAVENOUS PRN
Status: DISCONTINUED | OUTPATIENT
Start: 2025-01-29 | End: 2025-01-29

## 2025-01-29 RX ORDER — NALBUPHINE HYDROCHLORIDE 10 MG/ML
5 INJECTION INTRAMUSCULAR; INTRAVENOUS; SUBCUTANEOUS EVERY 4 HOURS PRN
Status: DISCONTINUED | OUTPATIENT
Start: 2025-01-29 | End: 2025-01-29

## 2025-01-29 RX ORDER — TERBUTALINE SULFATE 1 MG/ML
0.25 INJECTION, SOLUTION SUBCUTANEOUS
Status: DISCONTINUED | OUTPATIENT
Start: 2025-01-29 | End: 2025-01-29

## 2025-01-29 RX ORDER — ONDANSETRON 2 MG/ML
4 INJECTION INTRAMUSCULAR; INTRAVENOUS EVERY 6 HOURS PRN
Status: DISCONTINUED | OUTPATIENT
Start: 2025-01-29 | End: 2025-01-31 | Stop reason: HOSPADM

## 2025-01-29 RX ORDER — EPHEDRINE SULFATE 50 MG/ML
10 INJECTION INTRAVENOUS
Status: DISCONTINUED | OUTPATIENT
Start: 2025-01-29 | End: 2025-01-29

## 2025-01-29 RX ORDER — FENTANYL/BUPIVACAINE/NS/PF 2-1250MCG
PLASTIC BAG, INJECTION (ML) INJECTION
Status: COMPLETED
Start: 2025-01-29 | End: 2025-01-29

## 2025-01-29 RX ADMIN — SODIUM CHLORIDE, POTASSIUM CHLORIDE, SODIUM LACTATE AND CALCIUM CHLORIDE: 600; 310; 30; 20 INJECTION, SOLUTION INTRAVENOUS at 05:47

## 2025-01-29 RX ADMIN — Medication 166.7 ML: at 12:33

## 2025-01-29 RX ADMIN — BUPIVACAINE HYDROCHLORIDE 3 ML: 2.5 INJECTION, SOLUTION EPIDURAL; INFILTRATION; INTRACAUDAL; PERINEURAL at 10:01

## 2025-01-29 RX ADMIN — BUPIVACAINE HYDROCHLORIDE 2 ML: 2.5 INJECTION, SOLUTION EPIDURAL; INFILTRATION; INTRACAUDAL; PERINEURAL at 09:53

## 2025-01-29 RX ADMIN — FENTANYL CITRATE 100 MCG: 50 INJECTION INTRAMUSCULAR; INTRAVENOUS at 09:52

## 2025-01-29 RX ADMIN — Medication 10 ML/HR: at 10:05

## 2025-01-29 RX ADMIN — Medication 1 MILLI-UNITS/MIN: at 05:47

## 2025-01-29 RX ADMIN — BUPIVACAINE HYDROCHLORIDE 3 ML: 2.5 INJECTION, SOLUTION EPIDURAL; INFILTRATION; INTRACAUDAL; PERINEURAL at 09:57

## 2025-01-29 RX ADMIN — BUPIVACAINE HYDROCHLORIDE 10 ML/HR: 5 INJECTION, SOLUTION EPIDURAL; INTRACAUDAL; PERINEURAL at 10:05

## 2025-01-29 RX ADMIN — SODIUM CHLORIDE, POTASSIUM CHLORIDE, SODIUM LACTATE AND CALCIUM CHLORIDE: 600; 310; 30; 20 INJECTION, SOLUTION INTRAVENOUS at 10:00

## 2025-01-29 NOTE — L&D DELIVERY NOTE
Gunner Flowers [[id number]]      Labor Events     Labor: No   Steroids: None  Cervical Ripening Date/Time:      Cervical Ripening Type: Cruz/EASI  Antibiotics Received during Labor: No  Rupture Date/Time:  25 08:55:00   Rupture Type: AROM, Intact  Fluid Color: Clear  Induction: Oxytocin  Labor Complications: None       Anesthesia    Method: Epidural       Labor Length    3rd stage: 0h 06m       Delivery Details      Delivery Date: 25 Delivery Time: 12:28:00   Delivery Type: Vaginal, Spontaneous              [place] Presentation    Presentation: Vertex       Shoulder Dystocia    Shoulder Dystocia Present?: No       Assisted Delivery Details    Forceps Attempted?: No  Vacuum Extractor Attempted?: No                           Cord    Vessels: 3 Vessels  Complications: None  Delayed Cord Clamping?: Yes  Cord Clamped Date/Time: 2025 12:31:36  Cord Blood Disposition: Lab  Gases Sent?: No              Placenta    Date/Time: 2025 12:34:00  Removal: Spontaneous  Appearance: Intact  Disposition: Discarded       Lacerations    Episiotomy: None  Perineal Lacerations: None  Other Lacerations: no non-perineal laceration       Vaginal Counts    Initial Count Personnel: LAN  Initial Count Verified By: BRANDON  Intial Sponge Count: Correct Intial Needles Count: Correct Intial Instruments Count: Correct          Blood Loss  Mother: Jesus Flowers #[id number]     Start of Mother's Information      Delivery Blood Loss   Intrapartum & Postpartum: 25 0028 - 25 1242    Delivery Admission: 25 1739 - 25 1242         Intrapartum & Postpartum Delivery Admission    None                  End of Mother's Information  Mother: Jesus Flowers #[id number]                Delivery Providers    Delivering clinician: Anay Basurto MD     Provider Role    Anay Basurto MD Obstetrician    Ruma Smart RN Primary Nurse    Lorin Solis RN Primary  Nurse

## 2025-01-29 NOTE — ANESTHESIA PRE PROCEDURE
Department of Anesthesiology  Preprocedure Note       Name:  Jesus Ace   Age:  34 y.o.  :  1990                                          MRN:  [id number]         Date:  2025      Surgeon: * No surgeons listed *    Procedure: * No procedures listed *    Medications prior to admission:   Prior to Admission medications    Medication Sig Start Date End Date Taking? Authorizing Provider   aspirin 81 MG chewable tablet Take 1 tablet by mouth daily   Yes ProviderPastora MD   Prenatal Vit-Fe Fumarate-FA (PRENATAL VITAMINS PO) Take by mouth   Yes Provider, MD Pastora       Current medications:    Current Facility-Administered Medications   Medication Dose Route Frequency Provider Last Rate Last Admin   • lactated ringers infusion   IntraVENous Continuous Anay Basurto  mL/hr at 25 09 Rate Verify at 25   • lactated ringers bolus 500 mL  500 mL IntraVENous PRN Anay Basurto MD        Or   • lactated ringers bolus 1,000 mL  1,000 mL IntraVENous PRN Anay Basurto MD       • sodium chloride flush 0.9 % injection 5-40 mL  5-40 mL IntraVENous 2 times per day Anay Basurto MD       • sodium chloride flush 0.9 % injection 5-40 mL  5-40 mL IntraVENous PRN Anay Basurto MD       • 0.9 % sodium chloride infusion   IntraVENous PRN Anay Basurto MD       • oxytocin (PITOCIN) 30 units in 500 mL infusion  1-20 dayron-units/min IntraVENous Continuous Anay Basurto MD 12 mL/hr at 2532 12 dayron-units/min at 2532   • methylergonovine (METHERGINE) injection 200 mcg  200 mcg IntraMUSCular PRN Anay Basurto MD       • carboprost (HEMABATE) injection 250 mcg  250 mcg IntraMUSCular PRN Anay Basurto MD       • miSOPROStol (CYTOTEC) tablet 400 mcg  400 mcg Buccal PRN Anay Basurto MD       • tranexamic acid (CYKLOKAPRON) 1,000 mg in sodium chloride 0.9 % 110 mL IVPB (mini-bag)  1,000 mg IntraVENous Once PRN Anay Basurto MD       • oxytocin

## 2025-01-29 NOTE — ANESTHESIA PROCEDURE NOTES
Epidural Block    Patient location during procedure: OB  Reason for block: labor epidural  Staffing  Performed: anesthesiologist   Anesthesiologist: Lucius Watkins MD  Performed by: Lucius Watkins MD  Authorized by: Lucius Watkins MD    Epidural  Patient position: sitting  Prep: DuraPrep  Patient monitoring: cardiac monitor, continuous pulse ox and frequent blood pressure checks  Approach: midline  Location: L2-3  Injection technique: LEN saline  Provider prep: mask and sterile gloves  Needle  Needle type: Tuohy   Needle gauge: 17 G  Needle length: 3.5 in  Needle insertion depth: 7 cm  Catheter type: end hole  Catheter size: 18 G  Catheter at skin depth: 12 cmCatheter Secured: tegaderm and tape  Assessment  Sensory level: T6  Events: None  Hemodynamics: stable  Attempts: 1  Outcomes: uncomplicated and patient tolerated procedure well  Preanesthetic Checklist  Completed: patient identified, IV checked, site marked, risks and benefits discussed, surgical/procedural consents, equipment checked, pre-op evaluation, timeout performed, anesthesia consent given, oxygen available, monitors applied/VS acknowledged and fire risk safety assessment completed and verbalized

## 2025-01-29 NOTE — ANESTHESIA POSTPROCEDURE EVALUATION
Department of Anesthesiology  Postprocedure Note    Patient: Jesus Ace  MRN: [id number]  YOB: 1990  Date of evaluation: 1/29/2025    Procedure Summary       Date: 01/29/25 Room / Location:     Anesthesia Start: 0945 Anesthesia Stop: 1228    Procedure: Labor Analgesia Diagnosis:     Scheduled Providers:  Responsible Provider: Lucius Watkins MD    Anesthesia Type: epidural ASA Status: 2            Anesthesia Type: No value filed.    Humberto Phase I:      Humberto Phase II:      Anesthesia Post Evaluation    Patient location during evaluation: PACU  Patient participation: complete - patient participated  Level of consciousness: responsive to verbal stimuli and sleepy but conscious  Pain score: 2  Airway patency: patent  Cardiovascular status: blood pressure returned to baseline  Respiratory status: acceptable  Hydration status: stable  Comments: +Post-Anesthesia Evaluation and Assessment    Patient: Jesus Ace MRN: [id number]  SSN: [id number]   YOB: 1990  Age: 34 y.o.  Sex: female          Cardiovascular Function/Vital Signs    /84   Pulse 65   Temp 98.2 °F (36.8 °C)   Resp 16   SpO2 100%   Breastfeeding Unknown     Patient is status post * No procedures listed *.    Nausea/Vomiting: Controlled.    Postoperative hydration reviewed and adequate.    Pain:      Managed.    Neurological Status:       At baseline.    Mental Status and Level of Consciousness: Arousable.    Pulmonary Status:       Adequate oxygenation and airway patent.    Complications related to anesthesia: None    Post-anesthesia assessment completed. No concerns.    I have evaluated the patient and the patient is stable and ready to be discharged from PACU .    Signed By: Lucius Watkins MD    1/29/2025    Multimodal analgesia pain management approach  Pain management: satisfactory to patient    No notable events documented.

## 2025-01-29 NOTE — PROGRESS NOTES
2350: Bedside and Verbal shift change report given to DENY Bobo RN (oncoming nurse) by JEISON Ngo RN (offgoing nurse). Report included the following information Nurse Handoff Report, Adult Overview, MAR, Recent Results, and Event Log.    0530: RN at the bedside for reassessment. Pt rested well. Cruz balloon out with light traction applied by nurse.     0547: Pitocin started (see MAR).

## 2025-01-29 NOTE — H&P
History & Physical    Name: Jesus Ace MRN: [id number]  SSN: [id number]    YOB: 1990  Age: 34 y.o.  Sex: female        Subjective:     Estimated Date of Delivery: 25  OB History          4    Para   3    Term   1       2    AB        Living   2         SAB        IAB        Ectopic        Molar        Multiple        Live Births   2                Ms. Chitra Ace is admitted with pregnancy at 38w0d for induction of labor due to gestational HTN; hx pih.  Prenatal course as below. Please see prenatal records for details.    Patient Active Problem List    Diagnosis Date Noted    History of incompetent cervix, currently pregnant 2024    Pregnancy 2024     Primary Provider:  Sb; transferring to [facility]    EDC by 1st trim US   Timothy lr nl; HAVING A GIRL; low dose aspirin  Hx PIH 36wk  Hx 22wk PTB; demise; incompetent cervix; 2 subsequent healthy births assoc w cerclage  Noble's cerclage; 20wk 3.4cm; removed   Hx  x3 as above; both boys  IOB labs: normal  Genetic Screening: bronsonma nl  Anatomy: normal; cervix 3.4cm  GTT:  Flu:__  TDAP: done COVID:__  RSV:__  Rhogam: Anegative; done  GBS:  Circ:             Past Medical History:   Diagnosis Date    Hyperinsulinemia     Pre-eclampsia 2023     delivery Dec 2019     Past Surgical History:   Procedure Laterality Date    BREAST LUMPECTOMY      left    CERCLAGE   and     CERVICAL CERCLAGE N/A 2024    CERVIX CERCLAGE PLACEMENT performed by Anay Basurto MD at [facility] OR     Social History     Socioeconomic History    Marital status:    Tobacco Use    Smoking status: Never    Smokeless tobacco: Never   Vaping Use    Vaping status: Never Used   Substance and Sexual Activity    Alcohol use: Not Currently     Alcohol/week: 2.0 standard drinks of alcohol     Types: 2 Cans of beer per week    Drug use: Never    Sexual activity: Yes     Partners: Male

## 2025-01-29 NOTE — PROGRESS NOTES
0735 Bedside and Verbal shift change report given to NIR Smart RN (oncoming nurse) by DENY Bobo RN (offgoing nurse). Report included the following information Nurse Handoff Report, Intake/Output, MAR, and Recent Results.     0854 Dr Basurto at bedside and reviewed fhr strip    0915 LR bolus started    0932 Notified Dr Watkins patient requesting epidural    0948 Dr Watkins at bedside for epidural    0954 Epidural placed    1210 Dr Basurto at bedside for delivery    1438 TRANSFER - OUT REPORT:    Verbal report given to Miladis FITZGERALD on Jesus Ace  being transferred to [facility] for routine progression of patient care       Report consisted of patient's Situation, Background, Assessment and   Recommendations(SBAR).     Information from the following report(s) Nurse Handoff Report, Intake/Output, MAR, and Recent Results was reviewed with the receiving nurse.           Lines:   Peripheral IV 01/28/25 Left;Posterior Wrist (Active)   Site Assessment Clean, dry & intact 01/29/25 0800   Line Status Infusing 01/29/25 0800   Phlebitis Assessment No symptoms 01/29/25 0800   Infiltration Assessment 0 01/29/25 0800   Dressing Status Clean, dry & intact 01/29/25 0800   Dressing Type Transparent 01/29/25 0800        Opportunity for questions and clarification was provided.      Patient transported with:  Registered Nurse

## 2025-01-30 PROCEDURE — 6370000000 HC RX 637 (ALT 250 FOR IP): Performed by: OBSTETRICS & GYNECOLOGY

## 2025-01-30 PROCEDURE — 36415 COLL VENOUS BLD VENIPUNCTURE: CPT

## 2025-01-30 PROCEDURE — 96372 THER/PROPH/DIAG INJ SC/IM: CPT

## 2025-01-30 PROCEDURE — 85461 HEMOGLOBIN FETAL: CPT

## 2025-01-30 PROCEDURE — 1120000000 HC RM PRIVATE OB

## 2025-01-30 PROCEDURE — 86901 BLOOD TYPING SEROLOGIC RH(D): CPT

## 2025-01-30 PROCEDURE — 6360000002 HC RX W HCPCS: Performed by: OBSTETRICS & GYNECOLOGY

## 2025-01-30 PROCEDURE — 86900 BLOOD TYPING SEROLOGIC ABO: CPT

## 2025-01-30 RX ADMIN — DOCUSATE SODIUM 100 MG: 100 CAPSULE, LIQUID FILLED ORAL at 10:51

## 2025-01-30 RX ADMIN — HUMAN RHO(D) IMMUNE GLOBULIN 300 MCG: 300 INJECTION, SOLUTION INTRAMUSCULAR at 10:51

## 2025-01-30 NOTE — PROGRESS NOTES
Post-Partum Day Number 1 Progress Note    Jesus Ace     Assessment: Doing well, post partum day 1    Plan:  1. Continue routine postpartum and perineal care as well as maternal education.  2. N/A     Information for the patient's :  Chitra Ace, Girl Jesus [[id number]]   Vaginal, Spontaneous Patient doing well without significant complaint.  Voiding without difficulty, normal lochia.    Vitals:  BP (!) 144/88   Pulse 67   Temp 97.3 °F (36.3 °C) (Oral)   Resp 16   LMP 2024   SpO2 97%   Breastfeeding Unknown   Temp (24hrs), Av.8 °F (36.6 °C), Min:97.3 °F (36.3 °C), Max:98.2 °F (36.8 °C)        Exam:   Patient without distress.                Abdomen soft, fundus firm, nontender                Perineum with normal lochia noted.                Lower extremities are negative for swelling, cords or tenderness.    Labs:     Lab Results   Component Value Date/Time    WBC 8.4 2025 06:01 PM    WBC 9.3 2025 12:00 AM    WBC 10.2 2024 03:23 PM    WBC 12.1 2024 02:46 PM    WBC 11.2 2024 12:00 AM    HGB 11.9 2025 06:01 PM    HGB 12.7 2025 12:00 AM    HGB 11.8 2024 03:23 PM    HGB 13.1 2024 02:46 PM    HGB 12.8 2024 12:00 AM    HCT 35.7 2025 06:01 PM    HCT 39.4 2025 12:00 AM    HCT 35.6 2024 03:23 PM    HCT 39.7 2024 02:46 PM    HCT 39.5 2024 12:00 AM     2025 06:01 PM     2025 12:00 AM     2024 03:23 PM     2024 02:46 PM     2024 12:00 AM       Recent Results (from the past 24 hour(s))   RH IMMUNE GLOBULIN EVALUATION    Collection Time: 25  3:37 AM   Result Value Ref Range    ABO/Rh A NEGATIVE     Fetal Screen NEG     Unit Number [id number]     Product Code Blood Bank RH IMMUNE GLOBULIN     Unit Divison 00     Dispense Status Blood Bank ISSUED     Unit Issue Date/Time [id number]     Blood Bank Blood Product Expiration Date [id number]

## 2025-01-30 NOTE — DISCHARGE INSTRUCTIONS
Postpartum Support Groups  We know that all of us are dealing with a tremendous amount of uncertainty, confusion and disruption to our daily lives, which may result in increased anxiety, depression and fear. If you are feeling unsettled or worse, please know that we are here to help. During this time of increased caution and care for one another, [facility] [place] ([facility]) is offering virtual support groups to ALL MOTHERS in [place] regardless of the age of your child/children as a way to help weather this emotional storm together. Social support is an important part of self-care during this time of physical distancing.  Virtual postpartum support group meetings available at www.postpartumva.org  Warm Line: 427.606.6250    Breastfeeding Support Groups   1st and 3rd Wednesday of each month at [place] from 11a-12 2nd and 4th Tuesday of each month at [place] from 10-11a      [email or web address]

## 2025-01-30 NOTE — LACTATION NOTE
This note was copied from a baby's chart.  . Mother states that she did not breast feed her first 2 babies due to latching issues. Mother states this baby has been nursing well and that the latch is comfortable. Mother has abundant colostrum. Mother will call this IBCLC in for the next feeding to see baby at the breast. Educated mother on feeding cues, feeding on demand, offering both breasts at every feeding, and feeding at least every 3 hours.    2205: Returned to the room. Mother was attempting to latch baby. Baby was coming on and off the breast. Baby was latching to the tip of the nipple. Brought mother a nipple shield. Baby latched deeply. Audible swallows noted and rhythmic suck observed. Brought mother a hand pump for extra stimulation and showed her how to use.     0220: Returned to room. Mother had baby deeply latched on the right breast without the nipple shield. Audible swallows noted and rhythmic suck observed. Showed mother breast compressions. Mother will continue to feed baby on demand and not limit time at the breasts.     Feeding Plan:  Mother will keep baby skin to skin as often as possible, feed on demand, 8-12x/day , respond to feeding cues, obtain latch, listen for audible swallowing, be aware of signs of oxytocin release/ cramping,thirst,sleepiness while breastfeeding, offer both breasts,and will not limit feedings.  Mother agrees to utilize breast massage while nursing to facilitate lactogenesis.

## 2025-01-30 NOTE — PROGRESS NOTES
During this shift, patient states that she does not want any medication.  If she changes her mind she will let nurses know

## 2025-01-31 VITALS
HEART RATE: 80 BPM | TEMPERATURE: 97.7 F | OXYGEN SATURATION: 98 % | DIASTOLIC BLOOD PRESSURE: 92 MMHG | SYSTOLIC BLOOD PRESSURE: 142 MMHG | RESPIRATION RATE: 16 BRPM

## 2025-01-31 LAB
ABO + RH BLD: NORMAL
BLD PROD TYP BPU: NORMAL
BLOOD BANK BLOOD PRODUCT EXPIRATION DATE: NORMAL
BLOOD BANK DISPENSE STATUS: NORMAL
BPU ID: NORMAL
FETAL SCREEN: NORMAL
UNIT DIVISION: 0
UNIT ISSUE DATE/TIME: NORMAL

## 2025-01-31 PROCEDURE — 6370000000 HC RX 637 (ALT 250 FOR IP): Performed by: OBSTETRICS & GYNECOLOGY

## 2025-01-31 RX ADMIN — DOCUSATE SODIUM 100 MG: 100 CAPSULE, LIQUID FILLED ORAL at 00:01

## 2025-01-31 NOTE — PROGRESS NOTES
Post-Partum Day Number 2 Progress Note    Jesus Ace     Assessment: Doing well, post partum day 2    Plan:   1. Discharge home today  2. Follow up in office in 6 weeks with Anay Basurto MD  3. Post partum activity advised, diet as tolerated       Information for the patient's :  Chitra Ace, Girl Jesus [[id number]]   Vaginal, Spontaneous Patient doing well without significant complaint.  Voiding without difficulty, normal lochia.    Vitals:  BP (!) 140/88   Pulse 62   Temp 97.9 °F (36.6 °C) (Oral)   Resp 16   LMP 2024   SpO2 99%   Breastfeeding Unknown   Temp (24hrs), Av °F (36.7 °C), Min:97.3 °F (36.3 °C), Max:98.9 °F (37.2 °C)      Exam:         Patient without distress.                 Abdomen soft, fundus firm, nontender                 Lower extremities are negative for swelling, cords or tenderness.    Labs:     Lab Results   Component Value Date/Time    WBC 8.4 2025 06:01 PM    WBC 9.3 2025 12:00 AM    WBC 10.2 2024 03:23 PM    WBC 12.1 2024 02:46 PM    WBC 11.2 2024 12:00 AM    HGB 11.9 2025 06:01 PM    HGB 12.7 2025 12:00 AM    HGB 11.8 2024 03:23 PM    HGB 13.1 2024 02:46 PM    HGB 12.8 2024 12:00 AM    HCT 35.7 2025 06:01 PM    HCT 39.4 2025 12:00 AM    HCT 35.6 2024 03:23 PM    HCT 39.7 2024 02:46 PM    HCT 39.5 2024 12:00 AM     2025 06:01 PM     2025 12:00 AM     2024 03:23 PM     2024 02:46 PM     2024 12:00 AM       No results found for this or any previous visit (from the past 24 hour(s)).

## 2025-01-31 NOTE — DISCHARGE SUMMARY
Obstetrical Discharge Summary     Name: Jesus Ace MRN: [id number]  SSN: [id number]    YOB: 1990  Age: 34 y.o.  Sex: female      Admit Date: 2025    Discharge Date: 2025     Admitting Physician: Anay Basurto MD     Attending Physician:  Anay Basurto MD     Admission Diagnoses: Encounter for induction of labor [Z34.90]    Discharge Diagnoses:   Information for the patient's :  Chitra Ace, Gunner Lee [[id number]]   @[phone]@     Additional Diagnoses:  No components found for: \"OBEXTABORH\", \"OBEXTABSCRN\", \"OBEXTRUBELLA\", \"OBEXTGRBS\"    Hospital Course: Normal hospital course following the delivery.    Disposition at Discharge: Home or self care    Discharged Condition: Stable    Patient Instructions:   Current Discharge Medication List        CONTINUE these medications which have NOT CHANGED    Details   Prenatal Vit-Fe Fumarate-FA (PRENATAL VITAMINS PO) Take by mouth           STOP taking these medications       aspirin 81 MG chewable tablet Comments:   Reason for Stopping:               Reference my discharge instructions.    No follow-ups on file.     Signed By:  Anay Basurto MD     2025

## 2025-02-01 LAB
ABO + RH BLD: NORMAL
BLD PROD TYP BPU: NORMAL
BLD PROD TYP BPU: NORMAL
BLOOD BANK DISPENSE STATUS: NORMAL
BLOOD BANK DISPENSE STATUS: NORMAL
BLOOD GROUP ANTIBODIES SERPL: NORMAL
BLOOD GROUP ANTIBODIES SERPL: NORMAL
BPU ID: NORMAL
BPU ID: NORMAL
CROSSMATCH RESULT: NORMAL
CROSSMATCH RESULT: NORMAL
SPECIMEN EXP DATE BLD: NORMAL
UNIT DIVISION: 0
UNIT DIVISION: 0

## 2025-03-24 ENCOUNTER — TELEPHONE (OUTPATIENT)
Age: 35
End: 2025-03-24

## 2025-03-25 ENCOUNTER — POSTPARTUM VISIT (OUTPATIENT)
Age: 35
End: 2025-03-25

## 2025-03-25 VITALS
WEIGHT: 166 LBS | HEART RATE: 58 BPM | SYSTOLIC BLOOD PRESSURE: 97 MMHG | RESPIRATION RATE: 18 BRPM | OXYGEN SATURATION: 97 % | HEIGHT: 60 IN | BODY MASS INDEX: 32.59 KG/M2 | DIASTOLIC BLOOD PRESSURE: 67 MMHG | TEMPERATURE: 97.5 F

## 2025-03-25 DIAGNOSIS — R42 DIZZINESS: ICD-10-CM

## 2025-03-25 DIAGNOSIS — Z30.09 GENERAL COUNSELING AND ADVICE FOR CONTRACEPTIVE MANAGEMENT: ICD-10-CM

## 2025-03-25 PROCEDURE — 0503F POSTPARTUM CARE VISIT: CPT | Performed by: ADVANCED PRACTICE MIDWIFE

## 2025-03-25 RX ORDER — ACETAMINOPHEN 500 MG
1000 TABLET ORAL EVERY 6 HOURS PRN
COMMUNITY

## 2025-03-25 ASSESSMENT — PATIENT HEALTH QUESTIONNAIRE - PHQ9
SUM OF ALL RESPONSES TO PHQ QUESTIONS 1-9: 0
SUM OF ALL RESPONSES TO PHQ QUESTIONS 1-9: 0
2. FEELING DOWN, DEPRESSED OR HOPELESS: NOT AT ALL
SUM OF ALL RESPONSES TO PHQ QUESTIONS 1-9: 0
SUM OF ALL RESPONSES TO PHQ QUESTIONS 1-9: 0
1. LITTLE INTEREST OR PLEASURE IN DOING THINGS: NOT AT ALL

## 2025-03-25 NOTE — PROGRESS NOTES
Postpartum evaluation    Jesus Ace is a 35 y.o. female who presents for a postpartum exam.     She is now 6weeks post Delivery Type: normal spontaneous vaginal delivery.    Her baby is doing well.    She has had no menses since delivery.     She has had the following significant problems since her delivery: none    The patient is breast feeding without difficulty.     The patient would like to use vasectomy for birth control.     She is currently taking:   Prenatal Vitamins     She is due for her next AE in 12 months.     BP 97/67   Pulse 58   Temp 97.5 °F (36.4 °C) (Oral)   Resp 18   Ht 1.524 m (5')   Wt 75.3 kg (166 lb)   LMP 05/14/2024   SpO2 97%   Breastfeeding Yes   BMI 32.42 kg/m²     May resume normal activity; may swim, take tub baths, exercise again. Start slow on exercise and advance as tolerated. May resume in sexual activity, if breastfeeding may notice vaginal dryness, use a water based lubricant such as Astroglide.   Be mindful of mood disturbances for up to 1 year postpartum. EPDS 9 Reviewed signs/symptoms of postpartum depression/anxiety.  All questions answered and patient verbalized understanding.    PHYSICAL EXAMINATION  Patient declines presence of chaperone during today's visit.    Constitutional  Appearance: well-nourished, well developed, alert, in no acute distress    HENT  Head and Face: appears normal    Gastrointestinal  Abdominal Examination: abdomen non-tender to palpation, normal bowel sounds, no masses present  Liver and spleen: no hepatomegaly present, spleen not palpable   Hernias: no hernias identified    Genitourinary   exam deferred    Skin  General Inspection: no rash, no lesions identified    Neurologic/Psychiatric  Mental Status:  Orientation: grossly oriented to person, place and time  Mood and Affect: mood normal, affect appropriate    Assessment/Plan:  Normal postpartum check  Denies discomfort to vaginal area  Reviewed S/S PPD  breast  Contraception

## 2025-03-25 NOTE — PROGRESS NOTES
Chief Complaint   Patient presents with    Postpartum Care     C/o lightheadedness in the mornings         BP 97/67   Pulse 58   Temp 97.5 °F (36.4 °C) (Oral)   Resp 18   Ht 1.524 m (5')   Wt 75.3 kg (166 lb)   LMP 05/14/2024   SpO2 97%   Breastfeeding Yes   BMI 32.42 kg/m²      1. Have you been to the ER, urgent care clinic since your last visit?  Hospitalized since your last visit?No    2. Have you seen or consulted any other health care providers outside of the [facility] since your last visit?  Include any pap smears or colon screening. No

## 2025-03-27 LAB
25(OH)D3+25(OH)D2 SERPL-MCNC: 31 NG/ML (ref 30–100)
BASOPHILS # BLD AUTO: 0.1 X10E3/UL (ref 0–0.2)
BASOPHILS NFR BLD AUTO: 1 %
EOSINOPHIL # BLD AUTO: 0.3 X10E3/UL (ref 0–0.4)
EOSINOPHIL NFR BLD AUTO: 3 %
ERYTHROCYTE [DISTWIDTH] IN BLOOD BY AUTOMATED COUNT: 12.9 % (ref 11.7–15.4)
HCT VFR BLD AUTO: 39.6 % (ref 34–46.6)
HGB BLD-MCNC: 12.9 G/DL (ref 11.1–15.9)
IMM GRANULOCYTES # BLD AUTO: 0 X10E3/UL (ref 0–0.1)
IMM GRANULOCYTES NFR BLD AUTO: 0 %
LYMPHOCYTES # BLD AUTO: 4.4 X10E3/UL (ref 0.7–3.1)
LYMPHOCYTES NFR BLD AUTO: 45 %
MCH RBC QN AUTO: 30.1 PG (ref 26.6–33)
MCHC RBC AUTO-ENTMCNC: 32.6 G/DL (ref 31.5–35.7)
MCV RBC AUTO: 92 FL (ref 79–97)
MONOCYTES # BLD AUTO: 0.6 X10E3/UL (ref 0.1–0.9)
MONOCYTES NFR BLD AUTO: 6 %
NEUTROPHILS # BLD AUTO: 4.4 X10E3/UL (ref 1.4–7)
NEUTROPHILS NFR BLD AUTO: 45 %
PLATELET # BLD AUTO: 405 X10E3/UL (ref 150–450)
RBC # BLD AUTO: 4.29 X10E6/UL (ref 3.77–5.28)
TSH SERPL DL<=0.005 MIU/L-ACNC: 1.57 UIU/ML (ref 0.45–4.5)
WBC # BLD AUTO: 9.8 X10E3/UL (ref 3.4–10.8)

## 2025-03-28 ENCOUNTER — RESULTS FOLLOW-UP (OUTPATIENT)
Age: 35
End: 2025-03-28

## 2025-04-21 ENCOUNTER — TELEPHONE (OUTPATIENT)
Age: 35
End: 2025-04-21

## 2025-04-21 NOTE — TELEPHONE ENCOUNTER
Patient called to request a note to return to work.I told the patient that I would put a message back but also advised the patient to send a message via My Chart

## 2025-04-23 ENCOUNTER — PATIENT MESSAGE (OUTPATIENT)
Age: 35
End: 2025-04-23

## (undated) DEVICE — NEEDLE SPNL 22GA L3.5IN BLK HUB S STL REG WALL FIT STYL

## (undated) DEVICE — PERI/GYN PACK: Brand: CONVERTORS

## (undated) DEVICE — X-RAY DETECTABLE SPONGES,16 PLY: Brand: VISTEC

## (undated) DEVICE — ELECTRODE PT RET AD L9FT HI MOIST COND ADH HYDRGEL CORDED

## (undated) DEVICE — SURE SET-SINGLE BASIN-LF: Brand: MEDLINE INDUSTRIES, INC.

## (undated) DEVICE — DRAPE TWL SURG 16X26IN BLU ORB04] ALLCARE INC]

## (undated) DEVICE — SUTURE PROL SZ 1 L30IN NONABSORBABLE BLU L36MM CT-1 1/2 CIR 8425H

## (undated) DEVICE — STERILE LATEX POWDER-FREE SURGICAL GLOVESWITH NITRILE COATING: Brand: PROTEXIS

## (undated) DEVICE — STRAP RESTRAIN W3.5XL19IN TECLIN STRRP POS LEG DURING LITH

## (undated) DEVICE — TRAY PREP DRY W/ PREM GLV 2 APPL 6 SPNG 2 UNDPD 1 OVERWRAP

## (undated) DEVICE — 12 ML SYRINGE,LUER-LOCK TIP: Brand: MONOJECT

## (undated) DEVICE — Device

## (undated) DEVICE — ASTOUND STANDARD SURGICAL GOWN, XL: Brand: CONVERTORS

## (undated) DEVICE — BLADE ES L6IN ELASTOMERIC COAT EXT DURABLE BEND UPTO 90DEG

## (undated) DEVICE — 1200 GUARD II KIT W/5MM TUBE W/O VAC TUBE: Brand: GUARDIAN

## (undated) DEVICE — SOLUTION IV 1000ML 0.9% SOD CHL